# Patient Record
Sex: FEMALE
[De-identification: names, ages, dates, MRNs, and addresses within clinical notes are randomized per-mention and may not be internally consistent; named-entity substitution may affect disease eponyms.]

---

## 2019-12-09 ENCOUNTER — HOSPITAL ENCOUNTER (OUTPATIENT)
Dept: HOSPITAL 90 - EDH | Age: 34
Setting detail: OBSERVATION
LOS: 1 days | Discharge: HOME | End: 2019-12-10
Attending: OBSTETRICS & GYNECOLOGY | Admitting: OBSTETRICS & GYNECOLOGY
Payer: MEDICAID

## 2019-12-09 VITALS — BODY MASS INDEX: 40.15 KG/M2 | HEIGHT: 65 IN | WEIGHT: 241 LBS

## 2019-12-09 DIAGNOSIS — Z3A.29: ICD-10-CM

## 2019-12-09 DIAGNOSIS — O26.893: ICD-10-CM

## 2019-12-09 DIAGNOSIS — R10.9: ICD-10-CM

## 2019-12-09 DIAGNOSIS — Z90.49: ICD-10-CM

## 2019-12-09 LAB
PH UR STRIP: 6 [PH] (ref 5–8)
SP GR UR STRIP: 1.02 (ref 1–1.03)
UROBILINOGEN UR STRIP-MCNC: 1 MG/DL (ref 0.2–1)

## 2019-12-09 PROCEDURE — 80305 DRUG TEST PRSMV DIR OPT OBS: CPT

## 2019-12-09 PROCEDURE — 81001 URINALYSIS AUTO W/SCOPE: CPT

## 2019-12-09 PROCEDURE — 96361 HYDRATE IV INFUSION ADD-ON: CPT

## 2019-12-09 PROCEDURE — 99284 EMERGENCY DEPT VISIT MOD MDM: CPT

## 2019-12-09 PROCEDURE — 82120 AMINES VAGINAL FLUID QUAL: CPT

## 2019-12-09 PROCEDURE — 96360 HYDRATION IV INFUSION INIT: CPT

## 2019-12-10 LAB
AMPHETAMINES UR QL SCN: NEGATIVE
BARBITURATES UR QL SCN: NEGATIVE
BENZODIAZ UR QL SCN: NEGATIVE
BZE UR QL SCN: NEGATIVE
CANNABINOIDS UR QL SCN: NEGATIVE
DEPRECATED SQUAMOUS URNS QL MICRO: (no result) /HPF (ref 0–2)
OPIATES UR QL SCN: NEGATIVE
PCP UR QL SCN: NEGATIVE
RBC #/AREA URNS HPF: (no result) /HPF (ref 0–1)
WBC #/AREA URNS HPF: (no result) /HPF (ref 0–1)

## 2020-01-23 ENCOUNTER — HOSPITAL ENCOUNTER (OUTPATIENT)
Dept: HOSPITAL 90 - LDH | Age: 35
Setting detail: OBSERVATION
Discharge: HOME | End: 2020-01-23
Attending: OBSTETRICS & GYNECOLOGY | Admitting: OBSTETRICS & GYNECOLOGY
Payer: MEDICAID

## 2020-01-23 VITALS — BODY MASS INDEX: 42.88 KG/M2 | WEIGHT: 242 LBS | HEIGHT: 63 IN

## 2020-01-23 DIAGNOSIS — Z3A.36: ICD-10-CM

## 2020-01-23 DIAGNOSIS — O36.8130: Primary | ICD-10-CM

## 2020-01-23 LAB
PH UR STRIP: 6 [PH] (ref 5–8)
PROT UR QL STRIP: (no result) MG/DL
RBC #/AREA URNS HPF: (no result) /HPF (ref 0–1)
SP GR UR STRIP: 1.04 (ref 1–1.03)
UROBILINOGEN UR STRIP-MCNC: 1 MG/DL (ref 0.2–1)
WBC #/AREA URNS HPF: (no result) /HPF (ref 0–1)

## 2020-01-23 PROCEDURE — 96374 THER/PROPH/DIAG INJ IV PUSH: CPT

## 2020-01-23 PROCEDURE — 81001 URINALYSIS AUTO W/SCOPE: CPT

## 2020-01-23 PROCEDURE — 96360 HYDRATION IV INFUSION INIT: CPT

## 2020-01-23 PROCEDURE — 96361 HYDRATE IV INFUSION ADD-ON: CPT

## 2020-01-23 PROCEDURE — 76819 FETAL BIOPHYS PROFIL W/O NST: CPT

## 2020-01-27 ENCOUNTER — HOSPITAL ENCOUNTER (OUTPATIENT)
Dept: HOSPITAL 90 - LDH | Age: 35
Setting detail: OBSERVATION
Discharge: HOME | End: 2020-01-27
Attending: OBSTETRICS & GYNECOLOGY | Admitting: OBSTETRICS & GYNECOLOGY
Payer: MEDICAID

## 2020-01-27 VITALS — WEIGHT: 242 LBS | HEIGHT: 64 IN | BODY MASS INDEX: 41.32 KG/M2

## 2020-01-27 VITALS — SYSTOLIC BLOOD PRESSURE: 109 MMHG | DIASTOLIC BLOOD PRESSURE: 59 MMHG

## 2020-01-27 DIAGNOSIS — Z3A.36: ICD-10-CM

## 2020-01-27 DIAGNOSIS — Z87.891: ICD-10-CM

## 2020-01-27 DIAGNOSIS — O36.8130: Primary | ICD-10-CM

## 2020-01-27 DIAGNOSIS — Z90.49: ICD-10-CM

## 2020-02-17 ENCOUNTER — HOSPITAL ENCOUNTER (EMERGENCY)
Dept: HOSPITAL 90 - EDH | Age: 35
Discharge: HOME | End: 2020-02-17
Payer: MEDICAID

## 2020-02-17 DIAGNOSIS — Z90.49: ICD-10-CM

## 2020-02-17 DIAGNOSIS — Z98.890: ICD-10-CM

## 2020-02-17 DIAGNOSIS — Z87.891: ICD-10-CM

## 2020-02-17 DIAGNOSIS — G89.18: Primary | ICD-10-CM

## 2020-02-17 LAB
ALBUMIN SERPL-MCNC: 2 G/DL (ref 3.5–5)
ALT SERPL-CCNC: 31 U/L (ref 12–78)
APTT PPP: 26.9 SEC (ref 26.3–35.5)
AST SERPL-CCNC: 33 U/L (ref 10–37)
BASOPHILS NFR BLD AUTO: 0.1 % (ref 0–5)
BILIRUB DIRECT SERPL-MCNC: 0.1 MG/DL (ref 0–0.3)
BILIRUB SERPL-MCNC: 0.4 MG/DL (ref 0.2–1)
BUN SERPL-MCNC: 7 MG/DL (ref 7–18)
CHLORIDE SERPL-SCNC: 104 MMOL/L (ref 101–111)
CK SERPL-CCNC: 70 U/L (ref 21–232)
CO2 SERPL-SCNC: 27 MMOL/L (ref 21–32)
CREAT SERPL-MCNC: 0.7 MG/DL (ref 0.5–1.5)
EOSINOPHIL NFR BLD AUTO: 1.1 % (ref 0–8)
ERYTHROCYTE [DISTWIDTH] IN BLOOD BY AUTOMATED COUNT: 14.8 % (ref 11–15.5)
GFR SERPL CREATININE-BSD FRML MDRD: 102 ML/MIN (ref 60–?)
GLUCOSE SERPL-MCNC: 80 MG/DL (ref 70–105)
HCT VFR BLD AUTO: 30.8 % (ref 36–48)
INR PPP: 0.9 (ref 0.85–1.15)
LIPASE SERPL-CCNC: 65 U/L (ref 114–286)
LYMPHOCYTES NFR SPEC AUTO: 7.9 % (ref 21–51)
MCH RBC QN AUTO: 29.1 PG (ref 27–33)
MCHC RBC AUTO-ENTMCNC: 32.8 G/DL (ref 32–36)
MCV RBC AUTO: 88.8 FL (ref 79–99)
MONOCYTES NFR BLD AUTO: 2.8 % (ref 3–13)
NEUTROPHILS NFR BLD AUTO: 87.6 % (ref 40–77)
NRBC BLD MANUAL-RTO: 0 % (ref 0–0.19)
PLATELET # BLD AUTO: 373 K/UL (ref 130–400)
POTASSIUM SERPL-SCNC: 4 MMOL/L (ref 3.5–5.1)
PROT SERPL-MCNC: 6.7 G/DL (ref 6–8.3)
PROTHROMBIN TIME: 9.5 SEC (ref 9.6–11.6)
RBC # BLD AUTO: 3.47 MIL/UL (ref 4–5.5)
SODIUM SERPL-SCNC: 139 MMOL/L (ref 136–145)
WBC # BLD AUTO: 12.3 K/UL (ref 4.8–10.8)

## 2020-02-17 PROCEDURE — 80048 BASIC METABOLIC PNL TOTAL CA: CPT

## 2020-02-17 PROCEDURE — 36415 COLL VENOUS BLD VENIPUNCTURE: CPT

## 2020-02-17 PROCEDURE — 83690 ASSAY OF LIPASE: CPT

## 2020-02-17 PROCEDURE — 85025 COMPLETE CBC W/AUTO DIFF WBC: CPT

## 2020-02-17 PROCEDURE — 96374 THER/PROPH/DIAG INJ IV PUSH: CPT

## 2020-02-17 PROCEDURE — 80076 HEPATIC FUNCTION PANEL: CPT

## 2020-02-17 PROCEDURE — 96375 TX/PRO/DX INJ NEW DRUG ADDON: CPT

## 2020-02-17 PROCEDURE — 99285 EMERGENCY DEPT VISIT HI MDM: CPT

## 2020-02-17 PROCEDURE — 85730 THROMBOPLASTIN TIME PARTIAL: CPT

## 2020-02-17 PROCEDURE — 82550 ASSAY OF CK (CPK): CPT

## 2020-02-17 PROCEDURE — 85610 PROTHROMBIN TIME: CPT

## 2020-02-17 PROCEDURE — 74177 CT ABD & PELVIS W/CONTRAST: CPT

## 2025-03-13 ENCOUNTER — HOSPITAL ENCOUNTER (EMERGENCY)
Dept: HOSPITAL 90 - EDH | Age: 40
Discharge: HOME | End: 2025-03-13
Payer: MEDICAID

## 2025-03-13 VITALS
RESPIRATION RATE: 16 BRPM | TEMPERATURE: 98.3 F | SYSTOLIC BLOOD PRESSURE: 130 MMHG | DIASTOLIC BLOOD PRESSURE: 70 MMHG | HEART RATE: 65 BPM | OXYGEN SATURATION: 98 %

## 2025-03-13 VITALS — WEIGHT: 250 LBS | HEIGHT: 65 IN | BODY MASS INDEX: 41.65 KG/M2

## 2025-03-13 DIAGNOSIS — Z20.822: ICD-10-CM

## 2025-03-13 DIAGNOSIS — Z79.82: ICD-10-CM

## 2025-03-13 DIAGNOSIS — Z98.890: ICD-10-CM

## 2025-03-13 DIAGNOSIS — Z90.49: ICD-10-CM

## 2025-03-13 DIAGNOSIS — G43.909: Primary | ICD-10-CM

## 2025-03-13 LAB
APPEARANCE UR: CLEAR
BACTERIA URNS QL MICRO: (no result) /HPF
BASOPHILS # BLD AUTO: 0.04 K/UL (ref 0–0.2)
BASOPHILS NFR BLD AUTO: 0.5 % (ref 0–5)
BILIRUB UR QL STRIP: NEGATIVE MG/DL
BUN SERPL-MCNC: 12 MG/DL (ref 7–18)
CHLORIDE SERPL-SCNC: 102 MMOL/L (ref 101–111)
CO2 SERPL-SCNC: 29 MMOL/L (ref 21–32)
COLOR UR: (no result)
CREAT SERPL-MCNC: 0.7 MG/DL (ref 0.5–1)
DEPRECATED SQUAMOUS URNS QL MICRO: (no result) /HPF (ref 0–2)
EOSINOPHIL # BLD AUTO: 0.05 K/UL (ref 0–0.7)
EOSINOPHIL NFR BLD AUTO: 0.6 % (ref 0–8)
ERYTHROCYTE [DISTWIDTH] IN BLOOD BY AUTOMATED COUNT: 13.9 % (ref 11–15.5)
GFR SERPL CREATININE-BSD FRML MDRD: 113 ML/MIN (ref 90–?)
GLUCOSE SERPL-MCNC: 110 MG/DL (ref 70–105)
GLUCOSE UR STRIP-MCNC: NEGATIVE MG/DL
HCG UR QL: NEGATIVE
HCT VFR BLD AUTO: 40.3 % (ref 36–48)
HGB UR QL STRIP: NEGATIVE
HYALINE CASTS URNS QL MICRO: (no result) /LPF
IMM GRANULOCYTES # BLD: 0.03 K/UL (ref 0–1)
KETONES UR STRIP-MCNC: NEGATIVE MG/DL
LEUKOCYTE ESTERASE UR QL STRIP: 25 LEU/UL
LYMPHOCYTES # SPEC AUTO: 1.1 K/UL (ref 1–4.8)
LYMPHOCYTES NFR SPEC AUTO: 12.7 % (ref 21–51)
MCH RBC QN AUTO: 27.9 PG (ref 27–33)
MCHC RBC AUTO-ENTMCNC: 32.3 G/DL (ref 32–36)
MCV RBC AUTO: 86.5 FL (ref 79–99)
MONOCYTES # BLD AUTO: 0.3 K/UL (ref 0.1–1)
MONOCYTES NFR BLD AUTO: 3.5 % (ref 3–13)
MUCOUS THREADS URNS QL MICRO: (no result) LPF
NEUTROPHILS # BLD AUTO: 7.3 K/UL (ref 1.8–7.7)
NEUTROPHILS NFR BLD AUTO: 82.4 % (ref 40–77)
NITRITE UR QL STRIP: NEGATIVE
NRBC BLD MANUAL-RTO: 0 % (ref 0–0.19)
PH UR STRIP: 7.5 [PH] (ref 5–8)
PLATELET # BLD AUTO: 346 K/UL (ref 130–400)
POTASSIUM SERPL-SCNC: 4.6 MMOL/L (ref 3.5–5.1)
PROT UR QL STRIP: 10 MG/DL
RBC # BLD AUTO: 4.66 MIL/UL (ref 4–5.5)
RBC #/AREA URNS HPF: (no result) /HPF (ref 0–1)
SARS-COV-2 AG RESP QL IA.RAPID: (no result)
SODIUM SERPL-SCNC: 136 MMOL/L (ref 136–145)
SP GR UR STRIP: 1.02 (ref 1–1.03)
UROBILINOGEN UR STRIP-MCNC: 0.2 MG/DL (ref 0.2–1)
WBC # BLD AUTO: 8.9 K/UL (ref 4.8–10.8)
WBC #/AREA URNS HPF: (no result) /HPF (ref 0–1)

## 2025-03-13 PROCEDURE — 87426 SARSCOV CORONAVIRUS AG IA: CPT

## 2025-03-13 PROCEDURE — 70450 CT HEAD/BRAIN W/O DYE: CPT

## 2025-03-13 PROCEDURE — 96374 THER/PROPH/DIAG INJ IV PUSH: CPT

## 2025-03-13 PROCEDURE — 96375 TX/PRO/DX INJ NEW DRUG ADDON: CPT

## 2025-03-13 PROCEDURE — 85025 COMPLETE CBC W/AUTO DIFF WBC: CPT

## 2025-03-13 PROCEDURE — 87804 INFLUENZA ASSAY W/OPTIC: CPT

## 2025-03-13 PROCEDURE — 96361 HYDRATE IV INFUSION ADD-ON: CPT

## 2025-03-13 PROCEDURE — 81025 URINE PREGNANCY TEST: CPT

## 2025-03-13 PROCEDURE — 99284 EMERGENCY DEPT VISIT MOD MDM: CPT

## 2025-03-13 PROCEDURE — 36415 COLL VENOUS BLD VENIPUNCTURE: CPT

## 2025-03-13 PROCEDURE — 81001 URINALYSIS AUTO W/SCOPE: CPT

## 2025-03-13 PROCEDURE — 80048 BASIC METABOLIC PNL TOTAL CA: CPT

## 2025-03-13 RX ADMIN — SODIUM CHLORIDE ONE MLS/HR: 0.9 INJECTION, SOLUTION INTRAVENOUS at 12:02

## 2025-03-13 NOTE — ERN
General


Chief Complaint:  Headache


Stated Complaint:  HEADACHE AND VOMITING


Time Seen by MD:  11:17


Time Seen by Midlevel:  11:17


Source:  patient





History of Present Illness


Initial Comments


39-year-old female who presents to the emergency department due to headache and 

vomiting onset this morning.  Patient reports she has a history of migraines and

usually initiated with vomiting.  Denies any current vision change, and light 

sensitivity, noise sensitivity, head injuries, LOC or further associated 

symptoms.  Patient states that this headache feels different unlike her usual 

migraines.


Allergies:  


Coded Allergies:  


     No Known Drug Allergies (Unverified  Allergy, Unknown, 19)


Home Meds


Active Scripts


Aspirin/Acetaminophen/Caffeine (Excedrin Migraine Caplet) 250 Mg-250 Mg-65 Mg 

Tablet, 2 EACH PO TID, #42 TAB


   Prov:VIDYA CARPENTER         3/13/25


Reported Medications


Acetaminophen with Codeine (Acetaminophen-Cod #3 Tablet) 1 Each Tablet, 1 EACH 

PO Q4H, #30 TAB


   22


Ibuprofen (Ibuprofen 800 mg Tab) 800 Mg Tab, 800 MG PO Q8H PRN for PAIN, #60 TAB


   22


Docusate Sodium (Colace) 100 Mg Capsule, 100 MG PO BID, #60 CAP


   22


Prenatal Vits W-Ca,Fe,FA(<1Mg) (Prenatal Vitamins) 1 Each Tablet, 1 EACH PO 

DAILY, TAB


   20





Past Medical History


Past Medical History:  No Pertinent History


Past Surgical History:  Cholecystectomy, 





Female(pregnancy History)


:  4


Para:  2


Aborts:  2





ROS Dictation


Constitutional: Negative for fever,chills, and weight loss


Eyes: Negative for injury, pain,redness, and discharge


ENT: Negative for injury,pain or swelling


Cardiovascular: Negative for chest pain, palpitations, and edema


Respiratory: Negative for shortness of breath, cough, and wheezing, 


Abdomen/GI:  Positive for vomiting Negative for abdominal pain, nausea,  

diarrhea, and constipation


Back: Negative for injury and pain


: Negative for painful urination, bleeding or discharge


MS/Extremity: Negative for injury and deformity


Skin: Negative for rash, and discoloration


Neuro:  Positive for headache Negative for  weakness, numbness, tingling, and 

seizure 


Psych: Negative for suicide ideation, homicidal ideation, and hallucinations





Physical Exam


Physical Exam Dictation


General: awake, alert, no acute distress


Head/Face: Normocephalic, atraumatic


Eyes: PERRL, EOMI, normal conjunctiva


ENT: oral cavity clear,  oral mucosa moist


Neck:  Supple, normal range of motion


Cardiovascular: RRR, normal S1/S2


Respiratory: CTAB, no respiratory distress, no rales or wheezes


Abdomen: Soft, non-tender, non-distended, normal bowel sounds, no guarding or 

rebound.


Skin: Warm, dry, normal turgor, no rash


MS/Extremity: Pulses equal, no cyanosis, neurovascular intact, FROM


Neuro: COAx4, GCS 15, strength 5/5, CN 2-12 intact, normal cerebellar exam, 

normal gait,


Psych: Normal behavior, mood, and affect normal





Results


Laboratory and Microbiology


Lab and Micro Result





Laboratory Tests








Test


 3/13/25


11:33 3/13/25


11:41 3/13/25


11:59


 


Urine Color


 LIGHT-YELLOW


(YELLOW) 


 





 


Urine Appearance CLEAR (CLEAR)    


 


Urine pH 7.5 (5.0-8.0)    


 


Urine Specific Gravity


 1.024


(1.001-1.031) 


 





 


Urine Protein


 10 mg/dL


(NEGATIVE)  H 


 





 


Urine Glucose (UA)


 NEGATIVE mg/dL


(NEGATIVE) 


 





 


Urine Ketones


 NEGATIVE mg/dL


(NEGATIVE) 


 





 


Urine Occult Blood


 NEGATIVE


(NEGATIVE) 


 





 


Urine Nitrate


 NEGATIVE


(NEGATIVE) 


 





 


Urine Bilirubin


 NEGATIVE mg/dL


(NEGATIVE) 


 





 


Urine Urobilinogen


 0.2 mg/dL


(0.2-1.0) 


 





 


Urine Leukocyte Esterase


 25 Janes/uL


(NEGATIVE)  H 


 





 


Urine RBC


 6-10 /HPF


(0-1)  H 


 





 


Urine WBC


 2-5 /HPF (0-1)


H 


 





 


Urine Squamous Epithelial


Cells MOD /HPF (0-2)


 


 





 


Urine Bacteria


 RARE /HPF


(None Seen) 


 





 


Urine Hyaline Casts


 2-5 /LPF (0-1


/LPF)  H 


 





 


Urine HCG, Qualitative


 NEGATIVE


(NEGATIVE) 


 





 


Influenza Type A Antigen


 


 Negative For


Type A 





 


Influenza Type B Antigen


 


 Negative For


Type B 





 


SARS-CoV-2 Antigen (Rapid)


 


 PRESUMPTIVE


NEGATIVE 





 


White Blood Count


 


 


 8.9 K/uL


(4.8-10.8)


 


Red Blood Count


 


 


 4.66 MIL/uL


(4.00-5.50)


 


Hemoglobin


 


 


 13.0 g/dL


(12.0-16.0)


 


Hematocrit


 


 


 40.3 % (36-48)





 


Mean Corpuscular Volume


 


 


 86.5 fL


(79-99)


 


Mean Corpuscular Hemoglobin


 


 


 27.9 pg


(27.0-33.0)


 


Mean Corpuscular Hemoglobin


Concent 


 


 32.3 g/dL


(32.0-36.0)


 


Red Cell Distribution Width


 


 


 13.9 %


(11.0-15.5)


 


Platelet Count


 


 


 346 K/uL


(130-400)


 


Mean Platelet Volume


 


 


 9.0 fL


(7.5-10.5)


 


Immature Granulocyte % (Auto)   0.3 % (0-1)  


 


Neutrophils (%) (Auto)


 


 


 82.4 %


(40.0-77.0)  H


 


Lymphocytes (%) (Auto)


 


 


 12.7 %


(21.0-51.0)  L


 


Monocytes (%) (Auto)


 


 


 3.5 %


(3.0-13.0)


 


Eosinophils (%) (Auto)


 


 


 0.6 %


(0.0-8.0)


 


Basophils (%) (Auto)


 


 


 0.5 %


(0.0-5.0)


 


Neutrophils # (Auto)


 


 


 7.3 K/uL


(1.8-7.7)


 


Lymphocytes # (Auto)


 


 


 1.1 K/uL


(1.0-4.8)


 


Monocytes # (Auto)


 


 


 0.3 K/uL


(0.1-1.0)


 


Eosinophils # (Auto)


 


 


 0.05 K/uL


(0.00-0.70)


 


Basophils # (Auto)


 


 


 0.04 K/uL


(0.00-0.20)


 


Absolute Immature Granulocyte


(auto 


 


 0.03 K/uL


(0-1)


 


Nucleated Red Blood Cells


 


 


 0.0 %


(0.0-0.19)


 


Sodium Level


 


 


 136 mmol/L


(136-145)


 


Potassium Level


 


 


 4.6 mmol/L


(3.5-5.1)


 


Chloride Level


 


 


 102 mmol/L


(101-111)


 


Carbon Dioxide Level


 


 


 29 mmol/L


(21-32)


 


Blood Urea Nitrogen


 


 


 12 mg/dL


(7-18)


 


Creatinine


 


 


 0.7 mg/dL


(0.5-1.0)


 


Glomerular Filtration Rate


Calc 


 


 113 mL/min


(>90)


 


Random Glucose


 


 


 110 mg/dL


()  H


 


Total Calcium


 


 


 8.8 mg/dL


(8.5-10.1)








Labs Reviewed?:  Yes





EKG/XRAY/US/CT/MRI


CT Scan Comment


REASON: headache


ORDERING PHYSICIAN: VIDYA CARPENTER


PROCEDURE: HEAD WO  -  CT HEAD/BRAIN W/O CONTRAST





Exam Type: CT HEAD/BRAIN W/O CONTRAST





Clinical Information: headache





Comparison: None








CT Dose Index (CTDI): 57.33 mGy


Dose Length Product (DLP): 956.79 total mGy-cm








Findings:





The examination is unremarkable. Gray-white matter junction is


preserved. No intra or extra axial lesions or fluid collections are


seen. Specifically, gray and white matter are normal in signal


characteristics with normal caliber of ventricles and periventricular


cisterns with no evidence of intra or or extra-axial hemorrhage, lacunar


infarct, or major territorial infarct, mass, or other abnormality.





There are no infarcts. There are no hemorrhages.





Periventricular white matter locations are preserved.





The orbital contents and structures of the posterior fossa are intact. 





Impression:





Normal CT of the head.





This study was performed using dose reduction techniques to include


automated exposure control and/or adjustment of the mA and/or kV


according to patient size.




















DICTATED BY: MARINA GROSS MD


DATE: 25 1318





MDM


MDM: 


Differential diagnosis:


Rationale: 39-year-old female who presents to the emergency department due to 

headache and vomiting onset this morning.  Patient reports she has a history of 

migraines and usually initiated with vomiting.  Denies any current vision c

hange, and light sensitivity, noise sensitivity, head injuries, LOC or further 

associated symptoms.  Patient states that this headache feels different unlike 

her usual migraines.


Per physical examination patient is in no acute distress, neurologically intact.

 Labs obtained are nonspecific, UA contaminated.  CT head indicates unremarkable

with no acute abnormalities.  Patient was administered IV fluids, Reglan and 

Benadryl in the ED.  On re-examination patient verbalized headache had improved 

and vomiting had stopped.  Advised to follow up with PCP.  Return to the 

emergency department if any worsening symptoms.  Patient verbalized u

nderstanding.  Patient stable for discharge.





There are no social concerns with this patient.





I independently interpreted the test that were performed, results were reviewed 

by me and considered findings on radiology if ordered.





Medical management and examination interpretation discussions were had by me 

with other qualified healthcare professionals as indicated for the patient's 

care.


ED Course





Orders








Procedure Category Date Status





  Time 


 


Cbc With Differential LAB 3/13/25 Complete





  11:18 


 


Basic Metabolic Panel LAB 3/13/25 Complete





  11:18 


 


Urinalysis LAB 3/13/25 Complete





W/Microscopic  11:18 


 


Pregnancy,Urine Test LAB 3/13/25 Complete





  11:18 


 


Influenza Type A & B, LAB 3/13/25 Complete





Rapid  11:18 


 


Covid19 (Sars Antigen LAB 3/13/25 Complete





Rapid)  11:18 


 


Ondansetron Odt 4mg PHA 3/13/25 Complete





Tab (Zofran 4mg Odt)  11:30 


 


Ct Head/Brain W/O CT 3/13/25 Resulted





Contrast  11:18 


 


0.9%Nacl 1000ml (Ns PHA 3/13/25 Complete





1000ml)  12:00 


 


Diphenhydramine Hcl PHA 3/13/25 Complete





 (Benadryl Inj)  12:00 


 


Metoclopramide 10 PHA 3/13/25 Complete





Mg/2 Ml Vial (Reglan 1  12:00 








Current Medications








 Medications


  (Trade)  Dose


 Ordered  Sig/Cristopher


 Route


 PRN Reason  Start Time


 Stop Time Status Last Admin


Dose Admin


 


 Diphenhydramine


 HCl


  (BENAdryl INJ)  25 mg  ONCE  ONCE


 IV


   3/13/25 12:00


 3/13/25 12:01 DC 3/13/25 12:02





 


 Metoclopramide HCl


  (regLAN 10MG IV)  10 mg  ONCE  ONCE


 IVP


   3/13/25 12:00


 3/13/25 12:01 DC 3/13/25 12:02





 


 Ondansetron HCl


  (zoFRAN 4MG ODT)  4 mg  ONCE  ONCE


 SL


   3/13/25 11:30


 3/13/25 11:49 DC  





 


 Sodium Chloride  1,000 ml @ 


 0 mls/hr  ONCE  ONCE


 IV


   3/13/25 12:00


 3/13/25 12:01 DC 3/13/25 12:02











Vital Signs








  Date Time  Temp Pulse Resp B/P (MAP) Pulse Ox O2 Delivery O2 Flow Rate FiO2


 


3/13/25 13:58 98.2 65 16 130/70 98 Room Air* 0 21


 


3/13/25 11:35 98.1 68 16 132/74 98 Room Air* 0 21


 


3/13/25 11:12 97.3 69 20 134/76 99 Room Air 0 











DX & DISP


Disposition:  Discharge


Departure


Impression:  


   Primary Impression:  Migraine


Condition:  Stable


Scripts


Aspirin/Acetaminophen/Caffeine (Excedrin Migraine Caplet) 250 Mg-250 Mg-65 Mg 

Tablet


2 EACH PO TID, #42 TAB


   Prov: VIDYA CARPENTER         3/13/25





Additional Instructions:  


Discharge home.


Rest.


Follow up with primary care Higinio in 24 hours. 


Return to the ER for any acute changes or worsening symptoms.


If any medications were prescribed take as directed.  Okay to continue home 

medications unless otherwise discussed during your visit in the emergency room 

today.


Patient was also advised to follow-up with primary care physician in 1 to 2 days

for continued monitoring.


Referrals:  


BONNY SELLERS MD (PCP)


I performed the substantive portion of the visit.  I have reviewed and perso

adilson made and approve the management plan that is documented in the notes by 

myself or the [facility].  I acknowledge full responsibility for the patient's 

management plan.











VIDYA CARPENTER           Mar 13, 2025 13:47

## 2025-03-13 NOTE — [facility]
Exam Type: CT HEAD/BRAIN W/O CONTRAST



Clinical Information: headache



Comparison: None





CT Dose Index (CTDI): 57.33 mGy

Dose Length Product (DLP): 956.79 total mGy-cm





Findings:



The examination is unremarkable. Gray-white matter junction is

preserved. No intra or extra axial lesions or fluid collections are

seen. Specifically, gray and white matter are normal in signal

characteristics with normal caliber of ventricles and periventricular

cisterns with no evidence of intra or or extra-axial hemorrhage, lacunar

infarct, or major territorial infarct, mass, or other abnormality.



There are no infarcts. There are no hemorrhages.



Periventricular white matter locations are preserved.



The orbital contents and structures of the posterior fossa are intact. 



Impression:



Normal CT of the head.



This study was performed using dose reduction techniques to include

automated exposure control and/or adjustment of the mA and/or kV

according to patient size.